# Patient Record
(demographics unavailable — no encounter records)

---

## 2025-07-15 NOTE — PHYSICAL EXAM
[Well Developed] : well developed [Well Nourished] : well nourished [No Acute Distress] : no acute distress [Normal Conjunctiva] : normal conjunctiva [Normal Venous Pressure] : normal venous pressure [No Carotid Bruit] : no carotid bruit [Normal S1, S2] : normal S1, S2 [No Murmur] : no murmur [No Rub] : no rub [No Gallop] : no gallop [Clear Lung Fields] : clear lung fields [Good Air Entry] : good air entry [No Respiratory Distress] : no respiratory distress  [Soft] : abdomen soft [Non Tender] : non-tender [No Masses/organomegaly] : no masses/organomegaly [Normal Bowel Sounds] : normal bowel sounds [Normal Gait] : normal gait [No Edema] : no edema [No Cyanosis] : no cyanosis [No Clubbing] : no clubbing [No Varicosities] : no varicosities [No Rash] : no rash [No Skin Lesions] : no skin lesions [Moves all extremities] : moves all extremities [No Focal Deficits] : no focal deficits [Normal Speech] : normal speech [Alert and Oriented] : alert and oriented [Normal memory] : normal memory [de-identified] : Right foot is inappropriately internally rotated and fixed.Right upper extremity is contracted and immobile.

## 2025-07-15 NOTE — PAST MEDICAL HISTORY
[TextEntry] : -  Diabetes insipidus. -  Wears dentures.  -  Former smoker.   -  Colitis.  -  Chronic constipation.  -  Arthritis.  -  Chronic back pain.  -  Seizures, on Keppra.  -  Anxiety. -  Depression.  -  TBI. -  Right hemiparesis.

## 2025-07-15 NOTE — SOCIAL HISTORY
[TextEntry] : The patient is single. She has no children. She smoked from age 11-52, about 2 packs daily. She now smokes maybe 1 or 2 cigarettes daily. She smokes marijuana every day, about 2-3 joints. She is a recovering alcoholic. She is on disability and social security.

## 2025-07-15 NOTE — FAMILY HISTORY
[TextEntry] : The patient's mother is She has hypertension and dyslipidemia.  The patient's father passed away age 66 from thyroid cancer.

## 2025-07-15 NOTE — HISTORY OF PRESENT ILLNESS
[FreeTextEntry1] : Madison Chavez is a 54-year-old woman with a history of diabetes insipidus, tobacco abuse, TBI with right hemiparesis, chronic back pain, who presents today for cardiovascular consultation and preoperative risk assessment. The patient is scheduled to undergo right leg below knee amputation. She was found to have an abnormal baseline EKG, prompting cardiac referral. She is unable to engage in any activity because of the hemiparesis following brain injury. She denies recent chest pain, shortness of breath, palpitations, syncope, orthopnea, or PND. She has been taking her medications as prescribed.

## 2025-07-15 NOTE — REVIEW OF SYSTEMS
[Fever] : no fever [Headache] : no headache [Weight Gain (___ Lbs)] : no recent weight gain [Chills] : no chills [Feeling Fatigued] : not feeling fatigued [Weight Loss (___ Lbs)] : no recent weight loss [Blurry Vision] : no blurred vision [Seeing Double (Diplopia)] : no diplopia [Eye Pain] : no eye pain [Earache] : no earache [Discharge From Ears] : no discharge from the ears [Hearing Loss] : no hearing loss [Mouth Sores] : no mouth sores [Sore Throat] : no sore throat [Sinus Pressure] : no sinus pressure [Tinnitus] : no tinnitus [Vertigo] : no vertigo [SOB] : no shortness of breath [Dyspnea on exertion] : not dyspnea during exertion [Chest Discomfort] : no chest discomfort [Lower Ext Edema] : no extremity edema [Leg Claudication] : no intermittent leg claudication [Palpitations] : no palpitations [Orthopnea] : no orthopnea [PND] : no PND [Syncope] : no syncope [Cough] : no cough [Wheezing] : no wheezing [Coughing Up Blood] : no hemoptysis [Snoring] : no snoring [Abdominal Pain] : no abdominal pain [Nausea] : no nausea [Vomiting] : no vomiting [Heartburn] : no heartburn [Change in Appetite] : no change in appetite [Change In The Stool] : no change in stool [Dysphagia] : no dysphagia [Diarrhea] : diarrhea [Constipation] : constipation [Blood in Stool] : no blood in stool [Dysuria] : no dysuria [Pelvic Pain] : no pelvic pain [Abn Vaginal Bleeding] : no unexplained vaginal bleeding [Joint Pain] : joint pain [Joint Swelling] : no joint swelling [Joint Stiffness] : no joint stiffness [Muscle Cramps] : no muscle cramps [Myalgia] : myalgia [Rash] : no rash [Itching] : no itching [Change In Color Of Skin] : change in skin color [Skin Lesions] : no skin lesions [Telangiectasias] : no telangiectasias [Dizziness] : no dizziness [Tremor] : no tremor was seen [Numbness (Hypoesthesia)] : no numbness [Convulsions] : no convulsions [Tingling (Paresthesia)] : no tingling [Weakness] : no weakness [Limb Weakness (Paresis)] : limb weakness (Paresis) [Speech Disturbance] : no speech disturbance [Confusion] : no confusion was observed [Memory Lapses Or Loss] : no memory lapses or loss [Depression] : no depression [Anxiety] : no anxiety [Under Stress] : not under stress [Suicidal] : not suicidal [Easy Bleeding] : no tendency for easy bleeding [Swollen Glands] : no swollen glands [Easy Bruising] : no tendency for easy bruising

## 2025-07-15 NOTE — CARDIOLOGY SUMMARY
[de-identified] : From 7/9/2025: Sinus rhythm with a rate of 64 bpm, normal axis, normal NJ interval 142 ms, normal QRS duration 74 ms, normal QT interval 402 ms, with T wave flattening in V3, V4, T wave flattening in III.  
[de-identified] : From 7/9/2025: Sinus rhythm with a rate of 64 bpm, normal axis, normal NC interval 142 ms, normal QRS duration 74 ms, normal QT interval 402 ms, with T wave flattening in V3, V4, T wave flattening in III.  
no

## 2025-07-15 NOTE — PHYSICAL EXAM
[Well Developed] : well developed [Well Nourished] : well nourished [No Acute Distress] : no acute distress [Normal Conjunctiva] : normal conjunctiva [Normal Venous Pressure] : normal venous pressure [No Carotid Bruit] : no carotid bruit [Normal S1, S2] : normal S1, S2 [No Murmur] : no murmur [No Rub] : no rub [No Gallop] : no gallop [Clear Lung Fields] : clear lung fields [Good Air Entry] : good air entry [No Respiratory Distress] : no respiratory distress  [Soft] : abdomen soft [Non Tender] : non-tender [No Masses/organomegaly] : no masses/organomegaly [Normal Bowel Sounds] : normal bowel sounds [Normal Gait] : normal gait [No Edema] : no edema [No Cyanosis] : no cyanosis [No Clubbing] : no clubbing [No Varicosities] : no varicosities [No Rash] : no rash [No Skin Lesions] : no skin lesions [Moves all extremities] : moves all extremities [No Focal Deficits] : no focal deficits [Normal Speech] : normal speech [Alert and Oriented] : alert and oriented [Normal memory] : normal memory [de-identified] : Right foot is inappropriately internally rotated and fixed.Right upper extremity is contracted and immobile.

## 2025-07-15 NOTE — DISCUSSION/SUMMARY
[___ Month(s)] : in [unfilled] month(s) [FreeTextEntry1] : The patient's heart rate and blood pressure are well controlled. I have asked her to continue with her current medications as prescribed without change.  I have asked the patient to undergo an echocardiogram to assess LV size, wall thickness, systolic function, valvular function, and pulmonary artery systolic pressure.   I have asked the patient to undergo a Lexiscan Cardiolite stress test to evaluate for myocardial ischemia. She is unable to walk on the treadmill sufficiently due to her hemiparesis.  I have discussed smoking cessation with the patient. I have offered her multiple modalities including Wellbutrin, Nicotine products, hypnosis, acupuncture, and laser to the ear. I explained that tobacco causes multiple cancers in addition to chronic breathing problems.  I have asked the patient to follow a low salt, low fat, low cholesterol diet. I have asked the patient not to engage in any new exercises or activities until after the cardiac work up is complete.   I have asked that the patient follow up with me in one months' time, or sooner with any change in symptoms.   I, Anabel Tripp, am scribing for and the presence of Dr. Boby Sierra, the following sections HISTORY OF PRESENT ILLNESSS; CARDIOLOGY SUMMARY; ACTIVE PROBLEMS; PAST MEDICAL HISTORY; PAST SURGICAL HISTORY; FAMILY HISTORY; SOCIAL HISTORY; REVIEW OF SYSTEMS; PHYSICAL EXAM; ASSESSMENT; PLAN. [EKG obtained to assist in diagnosis and management of assessed problem(s)] : EKG obtained to assist in diagnosis and management of assessed problem(s)

## 2025-07-15 NOTE — ASSESSMENT
[FreeTextEntry1] : 1.  Preop for lower extremity amputation.  2.  TBI resulting in right hemiparesis.  3.  Tobacco abuse.  4.  Abnormal EKG.